# Patient Record
Sex: FEMALE | URBAN - METROPOLITAN AREA
[De-identification: names, ages, dates, MRNs, and addresses within clinical notes are randomized per-mention and may not be internally consistent; named-entity substitution may affect disease eponyms.]

---

## 2017-08-26 ENCOUNTER — OFFICE VISIT (OUTPATIENT)
Dept: URGENT CARE | Facility: URGENT CARE | Age: 27
End: 2017-08-26
Payer: COMMERCIAL

## 2017-08-26 VITALS
BODY MASS INDEX: 23.07 KG/M2 | WEIGHT: 138.5 LBS | HEART RATE: 66 BPM | RESPIRATION RATE: 16 BRPM | DIASTOLIC BLOOD PRESSURE: 71 MMHG | OXYGEN SATURATION: 97 % | HEIGHT: 65 IN | TEMPERATURE: 97 F | SYSTOLIC BLOOD PRESSURE: 116 MMHG

## 2017-08-26 DIAGNOSIS — M72.2 PLANTAR FASCIITIS: Primary | ICD-10-CM

## 2017-08-26 PROCEDURE — 99213 OFFICE O/P EST LOW 20 MIN: CPT | Performed by: PHYSICIAN ASSISTANT

## 2017-08-26 RX ORDER — DROSPIRENONE AND ETHINYL ESTRADIOL 0.02-3(28)
1 KIT ORAL DAILY
Qty: 28 TABLET | COMMUNITY
Start: 2017-08-26

## 2017-08-26 NOTE — PROGRESS NOTES
"SUBJECTIVE:  Chief Complaint   Patient presents with     Foot Problems     Shae Briceño is a 26 year old female who presents with a chief complaint of bilateral foot pain - worse on right foot.      Symptoms began 2 week(s) ago, are moderate and sudden onset.  Coincides with Marching (fitness course).  Course 8/11/17 - 8/26/17.     Concerned this might be recurrence of plantar fasciitis.  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  A second episode in 05/2016 - 09/2016 (brought on by extra running and marching).      Stabbing pain in the morning, tender arches.  While active tolerable.  Pain following activity.          No past medical history on file.  Current Outpatient Prescriptions   Medication Sig Dispense Refill     drospirenone-ethinyl estradiol (MARIBETH) 3-0.02 MG per tablet Take 1 tablet by mouth daily 28 tablet      Social History   Substance Use Topics     Smoking status: Never Smoker     Smokeless tobacco: Not on file     Alcohol use Yes       ROS:  Review of systems negative except as stated below    EXAM:   /71 (BP Location: Left arm, Cuff Size: Adult Regular)  Pulse 66  Temp 97  F (36.1  C) (Oral)  Resp 16  Ht 5' 5\" (1.651 m)  Wt 138 lb 8 oz (62.8 kg)  SpO2 97%  BMI 23.05 kg/m2  M/S Exam:  Bilateral tenderness at plantar fascia insertion and along arch.   GENERAL APPEARANCE: healthy, alert and no distress  CHEST: clear to auscultation  CV: regular rate and rhythm  EXTREMITIES: peripheral pulses normal  MS: no gross deformities noted, no evidence of inflammation in joints, FROM in all extremities.  SKIN: no suspicious lesions or rashes    X-RAY was not done.    ASSESSMENT:  (M72.2) Plantar fasciitis  (primary encounter diagnosis)  Plan:  Stretching, insert, follow up as needed        "

## 2017-08-26 NOTE — NURSING NOTE
"Chief Complaint   Patient presents with     Foot Problems     initial /71 (BP Location: Left arm, Cuff Size: Adult Regular)  Pulse 66  Temp 97  F (36.1  C) (Oral)  Resp 16  Ht 5' 5\" (1.651 m)  Wt 138 lb 8 oz (62.8 kg)  SpO2 97%  BMI 23.05 kg/m2 Estimated body mass index is 23.05 kg/(m^2) as calculated from the following:    Height as of this encounter: 5' 5\" (1.651 m).    Weight as of this encounter: 138 lb 8 oz (62.8 kg).  BP completed using cuff size: regular.  L  arm      Health Maintenance that is potentially due pending provider review:  NONE    n/a    Beny Clark ma  "

## 2017-08-26 NOTE — MR AVS SNAPSHOT
"              After Visit Summary   2017    Shae Briceño    MRN: [id number]           Patient Information     Date Of Birth          1990        Visit Information        Provider Department      2017 3:20 PM Ollie Escobar PA-C [facility]        Today's Diagnoses     Plantar fasciitis    -  1       Follow-ups after your visit        Who to contact     If you have questions or need follow up information about today's clinic visit or your schedule please contact [facility] directly at [phone].  Normal or non-critical lab and imaging results will be communicated to you by [employer], letter or phone within 4 business days after the clinic has received the results. If you do not hear from us within 7 days, please contact the clinic through [employer] or phone. If you have a critical or abnormal lab result, we will notify you by phone as soon as possible.  Submit refill requests through [employer] or call your pharmacy and they will forward the refill request to us. Please allow 3 business days for your refill to be completed.          Additional Information About Your Visit        MyChart Information     [employer] lets you send messages to your doctor, view your test results, renew your prescriptions, schedule appointments and more. To sign up, go to [email or web address] . Click on \"Log in\" on the left side of the screen, which will take you to the Welcome page. Then click on \"Sign up Now\" on the right side of the page.     You will be asked to enter the access code listed below, as well as some personal information. Please follow the directions to create your username and password.     Your access code is: [email or web address]  Expires: 2017  5:03 PM     Your access code will  in 90 days. If you need help or a new code, please call your [place] clinic or [phone].        Care EveryWhere ID     This is your Care EveryWhere ID. This could " "be used by other organizations to access your [facility] medical records  [id number]        Your Vitals Were     Pulse Temperature Respirations Height Pulse Oximetry BMI (Body Mass Index)    66 97  F (36.1  C) (Oral) 16 5' 5\" (1.651 m) 97% 23.05 kg/m2       Blood Pressure from Last 3 Encounters:   08/26/17 116/71   04/08/16 121/76    Weight from Last 3 Encounters:   08/26/17 138 lb 8 oz (62.8 kg)   04/08/16 134 lb (60.8 kg)              Today, you had the following     No orders found for display       Primary Care Provider Office Phone # Fax #    Meg Sherman [phone]       [facility] [address] [address]        Equal Access to Services     MIO CHAMORRO : Hadii aad ku hadasho Soshavon, waaxda luqadaha, qaybta kaalmada adekristelyaisabel, niki kidd . So [facility] [phone].    ATENCIÓN: Si habla español, tiene a gonzales disposición servicios gratuitos de asistencia lingüística. Mahendra al [phone].    We comply with applicable federal civil rights laws and [place] laws. We do not discriminate on the basis of race, color, national origin, age, disability sex, sexual orientation or gender identity.            Thank you!     Thank you for choosing [facility]  for your care. Our goal is always to provide you with excellent care. Hearing back from our patients is one way we can continue to improve our services. Please take a few minutes to complete the written survey that you may receive in the mail after your visit with us. Thank you!             Your Updated Medication List - Protect others around you: Learn how to safely use, store and throw away your medicines at www.disposemymeds.org.          This list is accurate as of: 8/26/17  5:03 PM.  Always use your most recent med list.                   Brand Name Dispense Instructions for use Diagnosis    drospirenone-ethinyl estradiol 3-0.02 MG per tablet    MARIBETH    " 28 tablet    Take 1 tablet by mouth daily

## 2017-12-03 ENCOUNTER — HEALTH MAINTENANCE LETTER (OUTPATIENT)
Age: 27
End: 2017-12-03